# Patient Record
Sex: FEMALE | Race: WHITE | NOT HISPANIC OR LATINO | Employment: FULL TIME | URBAN - METROPOLITAN AREA
[De-identification: names, ages, dates, MRNs, and addresses within clinical notes are randomized per-mention and may not be internally consistent; named-entity substitution may affect disease eponyms.]

---

## 2023-05-23 LAB
COBALAMIN (VITAMIN B12) (PG/ML) IN SER/PLAS: 351 PG/ML (ref 211–911)
CREATININE (MG/DL) IN SER/PLAS: 0.77 MG/DL (ref 0.5–1.05)
FOLATE (NG/ML) IN SER/PLAS: 19.5 NG/ML
GFR FEMALE: >90 ML/MIN/1.73M2
SYPHILIS TOTAL AB: NONREACTIVE
UREA NITROGEN (MG/DL) IN SER/PLAS: 11 MG/DL (ref 6–23)

## 2023-05-25 LAB — ANGIOTENSIN CONVERTING ENZYME: 53 U/L (ref 16–85)

## 2023-05-26 LAB
ACETONE (VOLAT): <5 MG/DL
ARSENIC: <10 UG/L
COPPER: 132 UG/DL (ref 80–155)
ETHANOL (VOLAT): <5 MG/DL
ISOPROPANOL (VOLAT): <5 MG/DL
LEAD, BLOOD (ARUP): <2 UG/DL
MERCURY BLOOD: <2.5 UG/L
METHANOL (VOLAT): <5 MG/DL
NIL(NEG) CONTROL SPOT COUNT: NORMAL
PANEL A SPOT COUNT: 0
PANEL B SPOT COUNT: 0
POS CONTROL SPOT COUNT: NORMAL
T-SPOT. TB INTERPRETATION: NEGATIVE

## 2023-05-27 LAB — VITAMIN B1, WHOLE BLOOD: 133 NMOL/L (ref 70–180)

## 2023-06-01 LAB
CNS DEMYELINATING DISEASE INTERP, S: NORMAL
MOG FACS, S: NEGATIVE
NMO/AQP4 FACS, S: NEGATIVE

## 2023-12-01 ENCOUNTER — OFFICE VISIT (OUTPATIENT)
Dept: OPHTHALMOLOGY | Facility: CLINIC | Age: 38
End: 2023-12-01
Payer: COMMERCIAL

## 2023-12-01 DIAGNOSIS — H40.1232 LOW-TENSION GLAUCOMA, BILATERAL, MODERATE STAGE: Primary | ICD-10-CM

## 2023-12-01 PROCEDURE — 92083 EXTENDED VISUAL FIELD XM: CPT | Performed by: OPHTHALMOLOGY

## 2023-12-01 PROCEDURE — 99214 OFFICE O/P EST MOD 30 MIN: CPT | Performed by: OPHTHALMOLOGY

## 2023-12-01 PROCEDURE — 92133 CPTRZD OPH DX IMG PST SGM ON: CPT | Performed by: OPHTHALMOLOGY

## 2023-12-01 RX ORDER — NETARSUDIL AND LATANOPROST OPHTHALMIC SOLUTION, 0.02%/0.005% .2; .05 MG/ML; MG/ML
1 SOLUTION/ DROPS OPHTHALMIC; TOPICAL NIGHTLY
COMMUNITY

## 2023-12-01 ASSESSMENT — TONOMETRY
OD_IOP_MMHG: 9
OS_IOP_MMHG: 10
IOP_METHOD: GOLDMANN APPLANATION

## 2023-12-01 ASSESSMENT — CONF VISUAL FIELD
OS_INFERIOR_TEMPORAL_RESTRICTION: 0
OD_INFERIOR_NASAL_RESTRICTION: 0
OS_SUPERIOR_TEMPORAL_RESTRICTION: 0
OD_SUPERIOR_TEMPORAL_RESTRICTION: 0
OS_INFERIOR_NASAL_RESTRICTION: 0
OS_NORMAL: 1
OD_NORMAL: 1
OS_SUPERIOR_NASAL_RESTRICTION: 0
OD_SUPERIOR_NASAL_RESTRICTION: 0
OD_INFERIOR_TEMPORAL_RESTRICTION: 0

## 2023-12-01 ASSESSMENT — REFRACTION_WEARINGRX
OS_AXIS: 015
OD_CYLINDER: -1.50
OS_CYLINDER: -1.00
OD_AXIS: 160
OD_SPHERE: -7.00
OS_SPHERE: -7.00

## 2023-12-01 ASSESSMENT — SLIT LAMP EXAM - LIDS
COMMENTS: NORMAL
COMMENTS: NORMAL

## 2023-12-01 ASSESSMENT — CUP TO DISC RATIO
OS_RATIO: 0.9
OD_RATIO: 0.9

## 2023-12-01 ASSESSMENT — VISUAL ACUITY
METHOD: SNELLEN - LINEAR
CORRECTION_TYPE: GLASSES
OS_CC: 20/20-1
OD_CC: 20/20-2

## 2023-12-01 ASSESSMENT — EXTERNAL EXAM - RIGHT EYE: OD_EXAM: NORMAL

## 2023-12-01 ASSESSMENT — EXTERNAL EXAM - LEFT EYE: OS_EXAM: NORMAL

## 2023-12-01 ASSESSMENT — ENCOUNTER SYMPTOMS: EYES NEGATIVE: 1

## 2023-12-01 NOTE — PROGRESS NOTES
Visual Acuity (Snellen - Linear)         Right Left    Dist cc 20/20-2 20/20-1      Correction: Glasses          Tonometry       Tonometry (Goldmann Applanation, 8:48 AM)         Right Left    Pressure 9 10                  Assessment/Plan   Last dilation: 11/11/22  color plates OU: 10/10 OU 3/16/18 and on 12/1/23     1.  Low Tension Glaucoma OU:  /550 Tm 20/21.  No effect of timolol and caused burning (study - yes).   Simbrinza -> irritation.   Pt with h/o progression of HVF and RNFL from 2021 -> 2022 with IOPs in the low teens 10-14.  IOPs are now seemingly really well controlled, but pt with possible progression of HVFs from 2022 -> 2023.  Given relative stability of RNFL and really low IOPs, will not advance Rx      Plan:  cont rocklatan OU QHS               f/u 1 month, repeat HVF OU    2.  Myopia / Astigmatism OU:  pt ~7D myope, no myopic degeneration      Plan:  monitor      3.  Possible Early Cataracts OU:  minimal change in nuclear portion of lenses and likely cause of her starburst pattern.      Plan:  monitor

## 2023-12-07 ENCOUNTER — OFFICE VISIT (OUTPATIENT)
Dept: PRIMARY CARE | Facility: CLINIC | Age: 38
End: 2023-12-07
Payer: COMMERCIAL

## 2023-12-07 ENCOUNTER — ANCILLARY PROCEDURE (OUTPATIENT)
Dept: RADIOLOGY | Facility: CLINIC | Age: 38
End: 2023-12-07
Payer: COMMERCIAL

## 2023-12-07 VITALS
TEMPERATURE: 98.7 F | SYSTOLIC BLOOD PRESSURE: 140 MMHG | WEIGHT: 134 LBS | BODY MASS INDEX: 21.63 KG/M2 | DIASTOLIC BLOOD PRESSURE: 87 MMHG

## 2023-12-07 DIAGNOSIS — K59.00 CONSTIPATION, UNSPECIFIED CONSTIPATION TYPE: ICD-10-CM

## 2023-12-07 DIAGNOSIS — K59.00 CONSTIPATION, UNSPECIFIED CONSTIPATION TYPE: Primary | ICD-10-CM

## 2023-12-07 PROCEDURE — 99213 OFFICE O/P EST LOW 20 MIN: CPT | Performed by: FAMILY MEDICINE

## 2023-12-07 PROCEDURE — 74019 RADEX ABDOMEN 2 VIEWS: CPT

## 2023-12-07 PROCEDURE — 1036F TOBACCO NON-USER: CPT | Performed by: FAMILY MEDICINE

## 2023-12-07 NOTE — PROGRESS NOTES
"Subjective   Patient ID: Dina Martinez is a 38 y.o. female who presents for No chief complaint on file..  HPI  \"Think I'm starting menopause\"- having hot flashes and mood swings     Having issues having a bowel movement, not necessarily constipated as not always hard but has to put her fingers in her vagina to have a bowel movement x few months  Has intermittent abd pain/cramping    Thinks she eats a pretty healthy diet, has not taken fiber regularly   Will occasionally use stool softeners, denies use of laxatives  Denies any urinary issues including no urinary incontinence     Gyn: 2 children born vaginally, had partial hyst in 2020 due to DUB     Objective   Visit Vitals  /87   Temp 37.1 °C (98.7 °F) (Oral)      Physical Exam  Alert, well-appearing.    CVS: RRR, no murmurs.     Respiratory:  Clear and equal breath sounds.    GI: Soft, nontender, no masses or hepatosplenomegaly.     Assessment/Plan   Diagnoses and all orders for this visit:  Constipation, unspecified constipation type  -     XR abdomen 2 views supine and erect or decub; Future      Referral to uro-gyn as may be a pelvic floor issue and/or rectocele       Maribel Bower MD  Family Medicine   [facility]  "

## 2024-07-01 ENCOUNTER — APPOINTMENT (OUTPATIENT)
Dept: OPHTHALMOLOGY | Facility: CLINIC | Age: 39
End: 2024-07-01
Payer: COMMERCIAL

## 2024-07-01 DIAGNOSIS — H40.1232 LOW-TENSION GLAUCOMA, BILATERAL, MODERATE STAGE: Primary | ICD-10-CM

## 2024-07-01 PROCEDURE — 99213 OFFICE O/P EST LOW 20 MIN: CPT | Performed by: OPHTHALMOLOGY

## 2024-07-01 PROCEDURE — 92083 EXTENDED VISUAL FIELD XM: CPT | Performed by: OPHTHALMOLOGY

## 2024-07-01 RX ORDER — NETARSUDIL AND LATANOPROST OPHTHALMIC SOLUTION, 0.02%/0.005% .2; .05 MG/ML; MG/ML
1 SOLUTION/ DROPS OPHTHALMIC; TOPICAL NIGHTLY
Qty: 2.5 ML | Refills: 11 | Status: SHIPPED | OUTPATIENT
Start: 2024-07-01

## 2024-07-01 RX ORDER — BRIMONIDINE TARTRATE 1 MG/ML
1 SOLUTION/ DROPS OPHTHALMIC 2 TIMES DAILY
Qty: 10 ML | Refills: 11 | Status: SHIPPED | OUTPATIENT
Start: 2024-07-01 | End: 2025-07-01

## 2024-07-01 ASSESSMENT — VISUAL ACUITY
OS_CC: 20/25
METHOD: SNELLEN - LINEAR
CORRECTION_TYPE: GLASSES
OD_CC: 20/25-1

## 2024-07-01 ASSESSMENT — CONF VISUAL FIELD
OD_INFERIOR_TEMPORAL_RESTRICTION: 0
OD_INFERIOR_NASAL_RESTRICTION: 0
OS_INFERIOR_NASAL_RESTRICTION: 0
OS_SUPERIOR_NASAL_RESTRICTION: 0
OS_INFERIOR_TEMPORAL_RESTRICTION: 0
OD_NORMAL: 1
OS_SUPERIOR_TEMPORAL_RESTRICTION: 0
OS_NORMAL: 1
OD_SUPERIOR_NASAL_RESTRICTION: 0
OD_SUPERIOR_TEMPORAL_RESTRICTION: 0

## 2024-07-01 ASSESSMENT — EXTERNAL EXAM - LEFT EYE: OS_EXAM: NORMAL

## 2024-07-01 ASSESSMENT — CUP TO DISC RATIO
OD_RATIO: 0.9
OS_RATIO: 0.9

## 2024-07-01 ASSESSMENT — TONOMETRY
OS_IOP_MMHG: 11
IOP_METHOD: GOLDMANN APPLANATION
OD_IOP_MMHG: 12

## 2024-07-01 ASSESSMENT — SLIT LAMP EXAM - LIDS
COMMENTS: NORMAL
COMMENTS: NORMAL

## 2024-07-01 ASSESSMENT — ENCOUNTER SYMPTOMS: EYES NEGATIVE: 1

## 2024-07-01 ASSESSMENT — EXTERNAL EXAM - RIGHT EYE: OD_EXAM: NORMAL

## 2024-07-01 ASSESSMENT — PACHYMETRY
OD_CT(UM): 551
OS_CT(UM): 551

## 2024-07-01 NOTE — PROGRESS NOTES
Visual Acuity (Snellen - Linear)         Right Left    Dist cc 20/25-1 20/25      Correction: Glasses          Tonometry       Tonometry (Goldmann Applanation, 10:36 AM)         Right Left    Pressure 12 11                  Assessment/Plan   Last dilation: 12/1/23  color plates OU: 10/10 OU 3/16/18 and on 12/1/23    1.  Low Tension Glaucoma OU:  /550 Tm 20/21.  No effect of timolol and caused burning (study - yes).   Simbrinza -> irritation.   Pt with h/o progression of HVF and RNFL from 2021 -> 2022 with IOPs in the low teens 10-14.  IOPs were seemingly well controlled, but pt with confirmed progression OS from 2022 -> 2024; OD has returned to baseline.        Plan:  cont rocklatan OU QHS                Start Alphagan-P 0.1% OU BID                f/u 1 month    2.  Myopia / Astigmatism OU:  pt ~7D myope, no myopic degeneration      Plan:  monitor      3.  Possible Early Cataracts OU:  minimal change in nuclear portion of lenses and likely cause of her starburst pattern.      Plan:  monitor

## 2024-08-05 ENCOUNTER — APPOINTMENT (OUTPATIENT)
Dept: OPHTHALMOLOGY | Facility: CLINIC | Age: 39
End: 2024-08-05
Payer: COMMERCIAL

## 2024-12-16 ENCOUNTER — APPOINTMENT (OUTPATIENT)
Dept: OPHTHALMOLOGY | Facility: CLINIC | Age: 39
End: 2024-12-16
Payer: COMMERCIAL

## 2025-01-03 ENCOUNTER — APPOINTMENT (OUTPATIENT)
Dept: OPHTHALMOLOGY | Facility: CLINIC | Age: 40
End: 2025-01-03
Payer: COMMERCIAL

## 2025-01-06 ENCOUNTER — APPOINTMENT (OUTPATIENT)
Dept: OPHTHALMOLOGY | Facility: CLINIC | Age: 40
End: 2025-01-06
Payer: COMMERCIAL

## 2025-01-06 DIAGNOSIS — H40.1232 LOW-TENSION GLAUCOMA, BILATERAL, MODERATE STAGE: Primary | ICD-10-CM

## 2025-01-06 PROCEDURE — 99214 OFFICE O/P EST MOD 30 MIN: CPT | Performed by: OPHTHALMOLOGY

## 2025-01-06 ASSESSMENT — PACHYMETRY
OS_CT(UM): 551
OD_CT(UM): 551

## 2025-01-06 ASSESSMENT — VISUAL ACUITY
METHOD: SNELLEN - LINEAR
OS_CC: 20/20-1
CORRECTION_TYPE: GLASSES
OD_CC: 20/20-1

## 2025-01-06 ASSESSMENT — TONOMETRY
OD_IOP_MMHG: 13
OS_IOP_MMHG: 12-13
IOP_METHOD: GOLDMANN APPLANATION

## 2025-01-06 ASSESSMENT — ENCOUNTER SYMPTOMS
HEMATOLOGIC/LYMPHATIC NEGATIVE: 0
CARDIOVASCULAR NEGATIVE: 0
ALLERGIC/IMMUNOLOGIC NEGATIVE: 0
GASTROINTESTINAL NEGATIVE: 0
RESPIRATORY NEGATIVE: 0
MUSCULOSKELETAL NEGATIVE: 0
PSYCHIATRIC NEGATIVE: 0
CONSTITUTIONAL NEGATIVE: 0
EYES NEGATIVE: 1
ENDOCRINE NEGATIVE: 0
NEUROLOGICAL NEGATIVE: 0

## 2025-01-06 NOTE — PROGRESS NOTES
Visual Acuity (Snellen - Linear)         Right Left    Dist cc 20/20-1 20/20-1      Correction: Glasses          Tonometry       Tonometry (Goldmann Applanation, 9:31 AM)         Right Left    Pressure 13 12-13                  Assessment/Plan   Last dilation: 12/1/23  color plates OU: 10/10 OU 3/16/18 and on 12/1/23    1.  Low Tension Glaucoma OU:  /550 Tm 20/21.  No effect of timolol and caused burning (study - yes).   Simbrinza -> irritation.   Pt with h/o progression of HVF and RNFL from 2021 -> 2022 with IOPs in the low teens 10-14.  IOPs were seemingly well controlled, but pt with confirmed progression OS from 2022 -> 2024; OD has returned to baseline.  Alphagan-P (tolerated), but ineffective.        Pt on MTMT.  Discussed options 1) CPM, 2) SLT R/B/A reviewed.  Likelihood of working ~35%, but given that the next option is trab, would still try SLT      Plan:  cont rocklatan OU QHS                D/c  Alphagan-P 0.1%                 pt wishes to proceed with SLT OD (RIGHT)    2.  Myopia / Astigmatism OU:  pt ~7D myope, no myopic degeneration      Plan:  monitor      3.  Possible Early Cataracts OU:  minimal change in nuclear portion of lenses and likely cause of her starburst pattern.      Plan:  monitor

## 2025-02-17 ENCOUNTER — APPOINTMENT (OUTPATIENT)
Dept: OPHTHALMOLOGY | Facility: CLINIC | Age: 40
End: 2025-02-17
Payer: COMMERCIAL

## 2025-02-17 DIAGNOSIS — H40.1232 LOW-TENSION GLAUCOMA, BILATERAL, MODERATE STAGE: ICD-10-CM

## 2025-02-17 DIAGNOSIS — H40.1222 LOW-TENSION GLAUCOMA OF LEFT EYE, MODERATE STAGE: Primary | ICD-10-CM

## 2025-02-17 PROCEDURE — 65855 TRABECULOPLASTY LASER SURG: CPT | Mod: RIGHT SIDE | Performed by: OPHTHALMOLOGY

## 2025-02-17 RX ORDER — PREDNISOLONE ACETATE 10 MG/ML
1 SUSPENSION/ DROPS OPHTHALMIC 4 TIMES DAILY
Qty: 5 ML | Refills: 0 | Status: SHIPPED | OUTPATIENT
Start: 2025-02-17 | End: 2025-02-21

## 2025-02-17 RX ORDER — PREDNISOLONE ACETATE 10 MG/ML
1 SUSPENSION/ DROPS OPHTHALMIC 4 TIMES DAILY
Qty: 5 ML | Refills: 0 | Status: SHIPPED | OUTPATIENT
Start: 2025-02-17 | End: 2025-02-17 | Stop reason: SDUPTHER

## 2025-02-17 NOTE — PROGRESS NOTES
Last dilation: 12/1/23  color plates OU: 10/10 OU 3/16/18 and on 12/1/23    1.  Low Tension Glaucoma OU:  /550 Tm 20/21.  No effect of timolol and caused burning (study - yes).   Simbrinza -> irritation.   Pt with h/o progression of HVF and RNFL from 2021 -> 2022 with IOPs in the low teens 10-14.  IOPs were seemingly well controlled, but pt with confirmed progression OS from 2022 -> 2024; OD has returned to baseline.  Alphagan-P (tolerated), but ineffective.        Pt on MTMT.  Previously discussed options 1) CPM, 2) SLT R/B/A reviewed.  Likelihood of working ~35%, but given that the next option is trab, would still try SLT      Plan:  cont rocklatan OU QHS               proceed with SLT OD (RIGHT) today 2/17/25, pre-laser IOP = 13 mmHg OD    2.  Myopia / Astigmatism OU:  pt ~7D myope, no myopic degeneration      Plan:  monitor      3.  Possible Early Cataracts OU:  minimal change in nuclear portion of lenses and likely cause of her starburst pattern.      Plan:  monitor

## 2025-02-20 ENCOUNTER — CLINICAL SUPPORT (OUTPATIENT)
Dept: AUDIOLOGY | Facility: CLINIC | Age: 40
End: 2025-02-20
Payer: COMMERCIAL

## 2025-02-20 DIAGNOSIS — R94.128 ABNORMAL TYMPANOGRAM: ICD-10-CM

## 2025-02-20 DIAGNOSIS — H90.0 CONDUCTIVE HEARING LOSS, BILATERAL: Primary | ICD-10-CM

## 2025-02-20 PROCEDURE — 92557 COMPREHENSIVE HEARING TEST: CPT | Performed by: AUDIOLOGIST

## 2025-02-20 PROCEDURE — 92567 TYMPANOMETRY: CPT | Performed by: AUDIOLOGIST

## 2025-02-21 NOTE — PROGRESS NOTES
Name: Dina Martinez  YOB: 1985  Age: 40 y.o.    Date of Evaluation:  02/20/2025   Dina Martinez is seen today at the request of Maribel Bower MD for an evaluation of hearing.  Dina reports a history of bilateral hearing loss that has worsened over the past few months. She reports chronic congestion and aural fullness and notes difficulty hearing in her daily life.     Evaluation:  Otoscopy revealed clear canals with visible tympanic membranes bilaterally.    Immittance:  Right: Type C middle ear function with normal compliance, significantly negative peak pressure, and normal ear canal volume.  Left: Type C middle ear function with normal compliance, significantly negative peak pressure, and normal ear canal volume.      Behavioral Audiometry:  Right: mild conductive hearing loss 125-8000 Hz. Excellent word understanding (100 %) at 70 dB HL.  Left: mild conductive hearing loss 125-8000 Hz. Excellent word understanding (100 %) at 70 dB HL.    Pure tone averages in agreement with speech reception thresholds.    Results:  Today's results were discussed with the patient indicating mild conductive hearing loss 125-8000 Hz  bilaterally. Type C tympanograms and excellent word understanding bilaterally.    Treatment Plan:  Follow-up with ENT regarding conductive hearing loss  Retest hearing in conjunction with medical management or if a change in hearing occurs      Time: 8089-7332    Completed by:  Alessandro Abreu, CCC-A  Licensed Senior Audiologist

## 2025-03-03 ENCOUNTER — APPOINTMENT (OUTPATIENT)
Dept: OPHTHALMOLOGY | Facility: CLINIC | Age: 40
End: 2025-03-03
Payer: COMMERCIAL

## 2025-03-03 DIAGNOSIS — H40.1232 LOW-TENSION GLAUCOMA, BILATERAL, MODERATE STAGE: Primary | ICD-10-CM

## 2025-03-03 PROCEDURE — 99213 OFFICE O/P EST LOW 20 MIN: CPT | Performed by: OPHTHALMOLOGY

## 2025-03-03 PROCEDURE — 92020 GONIOSCOPY: CPT | Performed by: OPHTHALMOLOGY

## 2025-03-03 RX ORDER — FLUTICASONE PROPIONATE 50 MCG
1 SPRAY, SUSPENSION (ML) NASAL DAILY
COMMUNITY

## 2025-03-03 ASSESSMENT — TONOMETRY
OD_IOP_MMHG: 10
OS_IOP_MMHG: 12
IOP_METHOD: GOLDMANN APPLANATION

## 2025-03-03 ASSESSMENT — ENCOUNTER SYMPTOMS
GASTROINTESTINAL NEGATIVE: 0
PSYCHIATRIC NEGATIVE: 0
HEMATOLOGIC/LYMPHATIC NEGATIVE: 0
MUSCULOSKELETAL NEGATIVE: 0
ALLERGIC/IMMUNOLOGIC NEGATIVE: 0
ENDOCRINE NEGATIVE: 0
CONSTITUTIONAL NEGATIVE: 0
CARDIOVASCULAR NEGATIVE: 0
RESPIRATORY NEGATIVE: 0
EYES NEGATIVE: 1
NEUROLOGICAL NEGATIVE: 0

## 2025-03-03 ASSESSMENT — GONIOSCOPY
OD_SUPERIOR: D30R TR PTM
OD_INFERIOR: D30R TR PTM
OS_TEMPORAL: D30R TR PTM
OS_NASAL: D30R TR PTM
OS_SUPERIOR: D30R TR PTM
OD_TEMPORAL: D30R TR PTM
OD_NASAL: D30R TR PTM
OS_INFERIOR: D30R TR PTM

## 2025-03-03 ASSESSMENT — EXTERNAL EXAM - LEFT EYE: OS_EXAM: NORMAL

## 2025-03-03 ASSESSMENT — CONF VISUAL FIELD
OD_SUPERIOR_NASAL_RESTRICTION: 0
OS_NORMAL: 1
OS_INFERIOR_NASAL_RESTRICTION: 0
OD_NORMAL: 1
OS_SUPERIOR_TEMPORAL_RESTRICTION: 0
OS_INFERIOR_TEMPORAL_RESTRICTION: 0
OD_INFERIOR_TEMPORAL_RESTRICTION: 0
OD_INFERIOR_NASAL_RESTRICTION: 0
OS_SUPERIOR_NASAL_RESTRICTION: 0
METHOD: COUNTING FINGERS
OD_SUPERIOR_TEMPORAL_RESTRICTION: 0

## 2025-03-03 ASSESSMENT — REFRACTION_WEARINGRX
OD_CYLINDER: -1.50
OD_AXIS: 160
OS_SPHERE: -7.00
OD_SPHERE: -7.00
OS_AXIS: 015
OS_CYLINDER: -1.00

## 2025-03-03 ASSESSMENT — EXTERNAL EXAM - RIGHT EYE: OD_EXAM: NORMAL

## 2025-03-03 ASSESSMENT — PACHYMETRY
OD_CT(UM): 551
OS_CT(UM): 551

## 2025-03-03 ASSESSMENT — SLIT LAMP EXAM - LIDS
COMMENTS: NORMAL
COMMENTS: NORMAL

## 2025-03-03 ASSESSMENT — CUP TO DISC RATIO
OS_RATIO: 0.9
OD_RATIO: 0.9

## 2025-03-03 ASSESSMENT — VISUAL ACUITY
OD_CC: 20/20
CORRECTION_TYPE: GLASSES
OS_CC: 20/20-1
METHOD: SNELLEN - LINEAR

## 2025-03-03 NOTE — PROGRESS NOTES
Visual Acuity (Snellen - Linear)         Right Left    Dist cc 20/20 20/20-1      Correction: Glasses          Tonometry       Tonometry (Goldmann Applanation, 8:47 AM)         Right Left    Pressure 10 12                  Assessment/Plan   Last dilation: 7/1/24  color plates OU: 10/10 OU 3/16/18 and on 12/1/23  Last gonio 3/3/25 OD:  D30r tr PTM    OS:  D30r tr PTM    1.  Low Tension Glaucoma OU:  /550 Tm 20/21.  No effect of timolol and caused burning (study - yes).   Simbrinza -> irritation.   Pt with h/o progression of HVF and RNFL from 2021 -> 2022 with IOPs in the low teens 10-14.  IOPs were seemingly well controlled, but pt with confirmed progression OS from 2022 -> 2024; OD has returned to baseline.  Alphagan-P (tolerated), but ineffective.        Pt on MTMT.  S/p SLT OD (RIGHT) 2/17/25, pre-laser IOP = 13 mmHg OD = 3 mmHg reduction and now IOP is acceptable      Plan:  cont rocklatan OU QHS                F/u 4 months (HVF, dilation, RNFL)    2.  Myopia / Astigmatism OU:  pt ~7D myope, no myopic degeneration      Plan:  monitor      3.  Possible Early Cataracts OU:  minimal change in nuclear portion of lenses and likely cause of her starburst pattern.      Plan:  monitor

## 2025-03-27 ENCOUNTER — APPOINTMENT (OUTPATIENT)
Facility: CLINIC | Age: 40
End: 2025-03-27
Payer: COMMERCIAL

## 2025-03-27 VITALS
WEIGHT: 134 LBS | BODY MASS INDEX: 21.53 KG/M2 | DIASTOLIC BLOOD PRESSURE: 91 MMHG | HEIGHT: 66 IN | SYSTOLIC BLOOD PRESSURE: 156 MMHG

## 2025-03-27 DIAGNOSIS — H90.0 CONDUCTIVE HEARING LOSS, BILATERAL: ICD-10-CM

## 2025-03-27 DIAGNOSIS — H69.93 DYSFUNCTION OF BOTH EUSTACHIAN TUBES: Primary | ICD-10-CM

## 2025-03-27 PROCEDURE — 3008F BODY MASS INDEX DOCD: CPT

## 2025-03-27 PROCEDURE — 1036F TOBACCO NON-USER: CPT

## 2025-03-27 PROCEDURE — 92504 EAR MICROSCOPY EXAMINATION: CPT

## 2025-03-27 PROCEDURE — 99203 OFFICE O/P NEW LOW 30 MIN: CPT

## 2025-03-27 NOTE — PROGRESS NOTES
Patient ID: Dina Martinez is a 40 y.o. female who presents for the evaluation of hearing loss.    PROVIDER IMPRESSIONS:  DIAGNOSES/PROBLEMS:  - Bilateral Eustachian tube dysfunction  - Bilateral conductive hearing loss    ASSESSMENT:   Dina Martinez is a pleasant 40 y.o. female who presents with symptoms of bilateral hearing difficulty, bilateral aural fullness, and intermittent bilateral non-pulsatile tinnitus.  Based on the clinical information provided, symptoms and clinical exam findings are consistent with bilateral Eustachian tube dysfunction. Otologic exam today revealed bilateral TM retraction. Reassurance provided to patient that exam today showed no evidence of acute infection or inflammation in the EAC bilaterally and that TM appears with no evidence of infection, effusion, or perforation bilaterally.  Audiogram reviewed in detail with the patient, which revealed mild bilateral conductive hearing loss across all frequencies and type C tympanogram bilaterally. The patient was counseled on the possible etiologies of obstructive ETD. Typical symptoms were reviewed, including otalgia, aural fullness, muffled or decreased hearing, tinnitus, plugged ear sensation, and vertigo or disequilibrium in severe cases.  The patient was educated that shiela-challenge, such as air travel or land elevation, may provoke symptom presentation or exacerbation.    PLAN:  I recommended starting nasal steroid spray  fluticasone (Flonase). I recommended either 2 puffs into each nostril daily, or 1 puff into each nostril twice daily for a consistent trial of at least 4-6 weeks. Patient counseled that this medication is a topical intranasal steroid nasal spray indicated to reduce nasal inflammation. Patient was educated on proper administration of nasal spray, by tilting their head down and pointing the applicator tip towards the lateral wall of the nose/corner of the eye on the same side. I advised patient to avoid pointing  applicator toward the septum due to the risk of nasal bleeding.  Patient informed to discontinue the spray if they experience adverse side effects. This medication may be purchased over the counter; a prescription may be sent to the patient's pharmacy upon request.  I recommended that the patient initiates the following 12-day cycled Afrin regimen:  Begin use of Afrin. Administer 2 sprays in each nostril twice per day. Do this for 3 days in a row. Take one day off. Do it for another 3 days in a row.  Take one day off. Do it for another 3 days in a row, and then stop the medication cycle.  If you do not take a break using Afrin after 3 days of use, rebound nasal congestion may occur. This medication may be purchased over the counter; a prescription may be sent to the patient's pharmacy upon request.  Patient instructed to practice autoinsufflation (pinch your nose and pop your ears) several times per day to exercise the Eustachian Tube.   Follow-up: Patient may schedule for follow-up in 3-4 weeks with repeat audiogram to evaluate treatment outcomes. They may follow-up sooner, if needed.  If there is no improvement in symptoms or repeat audiogram results at follow-up, I may consider a referral to my otology physician colleagues to determine further management or for possible procedural intervention (i.e. myringotomy, PE tube insertion, Eustachian tube dilation). Patient is agreeable to this plan, all questions were answered to patient's satisfaction.     Subjective   HPI: Dina Martinez is a 40 y.o. female who presents for the initial evaluation of hearing difficulty in both ears.  The patient states that symptom onset began approximately 1 yr ago. She reports that hearing in both ears seems muffled, right worse than left. Reports that she has also noticed a fullness sensation in both ears. States that she has experienced non-pulsatile tinnitus in both ears for the past several years. When asked about the presence of  ear pain, ear itching, ear drainage, autophony, dizziness or vertigo, she admits to none. When asked about pertinent otologic history, the patient reports a history of recurrent ear infections and sinus infections throughout her lifetime. States that she intermittently uses Flonase spray and o.t.c. oral antihistamine. She denies any recent ear/sinus infection treatment with antibiotic therapy. She denies history of ear surgery, denies history of PE tube insertion, and denies history of prolonged/traumatic loud noise exposure. The patient does not endorse a family history of hearing loss.       PATIENT HISTORY:  Past Medical History:   Diagnosis Date    Encounter for gynecological examination (general) (routine) without abnormal findings 08/27/2019    Encounter for annual routine gynecological examination    Excessive and frequent menstruation with regular cycle 06/19/2020    Menorrhagia with regular cycle    Low-tension glaucoma, bilateral, moderate stage 02/08/2017    Low-tension glaucoma of both eyes, moderate stage    Low-tension glaucoma, bilateral, severe stage 04/03/2017    Low-tension glaucoma of both eyes, severe stage    Myopia, bilateral 11/30/2018    High myopia, both eyes    Pelvic and perineal pain 08/26/2020    Pelvic pain    Personal history of other diseases of the respiratory system 11/06/2017    History of acute sinusitis    Personal history of other diseases of the respiratory system 01/02/2017    History of acute sinusitis    Primary open-angle glaucoma, bilateral, mild stage 11/22/2016    Primary open angle glaucoma of both eyes, mild stage    Primary open-angle glaucoma, unspecified eye, stage unspecified 11/22/2016    POAG (primary open-angle glaucoma)    Tinnitus       Past Surgical History:   Procedure Laterality Date    OTHER SURGICAL HISTORY  03/06/2017    History Of Prior Surgery      Allergies   Allergen Reactions    Amoxicillin Unknown     Fever, and rash    Nickel Dermatitis     Adhesive Tape-Silicones Unknown    Dorzolamide Hives    Latex Rash        Current Outpatient Medications:     Alphagan P 0.1 % ophthalmic solution, Administer 1 drop into both eyes 2 times a day., Disp: 10 mL, Rfl: 11    cetirizine (ZYRTEC) 10 mg capsule, Take 1 tablet by mouth once daily., Disp: , Rfl:     fluticasone (Flonase) 50 mcg/actuation nasal spray, Administer 1 spray into each nostril once daily. Shake gently. Before first use, prime pump. After use, clean tip and replace cap., Disp: , Rfl:     Rocklatan 0.02-0.005 % drops, Administer 1 drop into both eyes once daily at bedtime., Disp: 2.5 mL, Rfl: 11   Tobacco Use: Low Risk  (3/3/2025)    Patient History     Smoking Tobacco Use: Never     Smokeless Tobacco Use: Never     Passive Exposure: Not on file      Alcohol Use: Not on file      Social History     Substance and Sexual Activity   Drug Use Never        Review of Systems   All other systems negative.     Objective   Visit Vitals  Smoking Status Never        PHYSICAL EXAM:  General appearance: Appears well, well-nourished, well groomed. No acute distress.   Constitutional: No fever, chills, weight loss or weight gain.  Communication: Normal communication  Psychiatric: Oriented to person, place and time. Normal mood and affect.  Neurologic: Cranial nerves II-XII grossly intact and symmetric bilaterally.  Cardiovascular: Examination of peripheral vascular system shows no clubbing or cyanosis.  Respiratory: No respiratory distress increased work of breathing. Inspection of the chest with symmetric chest expansion and normal respiratory effort.  Skin: No head and neck rashes.  Head: Normocephalic. Atraumatic with no masses, lesions or scarring.  Face: Normal symmetry. No scars or deformities.  Eyes: Conjunctiva not edematous or erythematous. PERRLA  Neck: Supple and symmetric, trachea midline. Lymph nodes with no adenopathy.  Head: Normocephalic. Atraumatic with no masses, lesions or scarring.  Eyes: PERRL,  EOMI, Conjunctiva is clear. No nystagmus.  Nose: External inspection of nose: No nasal lesions, lacerations or scars. No tenderness on frontal or maxillary sinus palpation. Anterior rhinoscopy with limited visualization past the inferior turbinates: Septum is deviated to the left.  No septal perforation or lesions. No septal hematoma/ seroma.  No signs of bleeding.  No evidence of intranasal polyps.  Inferior turbinates are hypertrophied.    Throat:  Floor of mouth is clear, no masses.  Tongue appears normal, no lesions or masses. Gums, gingiva, buccal mucosa appear pink and moist, no lesions. Teeth are in intact.  No obvious dental infections.  Peritonsillar regions appear symmetric without swelling.  Hard and soft palate appear normal, no obvious cleft. Uvula is midline.  Left Tonsil -- 1.5+, no exudates.  Right Tonsil -- 1.5+, no exudates.  Oropharynx: No lesions. Retropharyngeal wall is flat.  No postnasal drip.  Salivary Glands: Symmetric bilaterally.  No palpable masses.  No evidence of acute infection or salivary stones.  TMJ: Normal, no trismus.  Right Ear: External inspection of ear with no deformity, scars, or masses. Mastoid is nontender. External auditory canal is clear.  TM is intact with mild retraction and no sign of infection, effusion, or perforation seen. Auto insufflation is visible under microscopy with hypomobile TM.   Left Ear: External inspection of ear with no deformity, scars, or masses. Mastoid is nontender. External auditory canal is clear.  TM is intact with mild retraction and no sign of infection, effusion, or perforation seen. Auto insufflation is not visible under microscopy.    RESULTS:  I personally reviewed the patient's audiogram from 2/20/25, which revealed the following results: Right ear with mild conductive hearing loss across all frequencies. Left ear with mild conductive hearing loss across all frequencies. Right ear with Type C tympanogram, and left ear with Type C  tympanogram. Right ear with 100% WRS at 70 dB, and left ear with 100% WRS at 70 dB. Acoustic reflexes MUNA right ipsilateral, and MUNA left ipsilateral.      Mary Jane Alonso, APRN-CNP

## 2025-04-29 NOTE — PROGRESS NOTES
AUDIOLOGIC EVALUATION  Name: Dina Martinez  YOB: 1985  MRN: [id number]  Age: 40 y.o.    Date of Evaluation:  4/30/2025     History:  Dina Martinez, 40 y.o., was seen today for a hearing evaluation in a conjunction visit with ISABELL Duron. The patient reported that her symptoms have improved but are not gone entirely. She noted persistent pressure in both ears. She reported that she is able to pop them. This can cause short-duration dizziness. She has intermittent tinnitus in both ears. Her hearing has slightly improved since her last visit. She denied otalgia and otorrhea.     Recall: Dina reports a history of bilateral hearing loss that has worsened over the past few months. She reports chronic congestion and aural fullness and notes difficulty hearing in her daily life.     Previous audiologic evaluation on 2/20/2025 revealed a mild conductive hearing loss 125-8000 Hz  bilaterally. Type C tympanograms and excellent word understanding bilaterally.    Evaluation:    Otoscopy  Clear canals bilaterally    Tympanometry  Right ear: Type A, normal ear canal volume and compliance.  Left ear: Type A, normal ear canal volume and compliance.     Acoustic Reflexes  Right ear: Could not maintain seal  Left ear: Could not maintain seal    Audiometric Evaluation  Right ear: mild, likely conductive, hearing loss through 250 Hz rising to normal hearing. Word recognition ability estimated to be excellent(96%) at 50 dB HL based on an NU-6 recorded 25-word list.  Left ear: normal hearing sensitivity. Word recognition ability estimated to be excellent(100%) at 50 dB HL based on an NU-6 recorded ordered by difficulty 10-word list.    When compared to previous test results of 2/20/2025, thresholds are stable with the exception of improved hearing sensitivity from 500 - 2000 Hz in the right ear only.    The test results were discussed with the patient and they were returned to ISABELL Duron  for completion of the office visit.    Impressions  Today's evaluation revealed normal hearing in the left ear and a mild hearing loss through 250 Hz rising to normal hearing in the right ear. Word recognition abilities were measured to be excellent bilaterally. Tympanograms were type A (normal) in both ears.    All patient questions were answered.     Recommendations  - Continue medical follow-up with established providers   - Continue medical follow-up with ISABELL Duron  - Re-test hearing in conjunction with otologic management    Time: 4105-9530    ENRIKE Campbell, CCC-A  Licensed Audiologist

## 2025-04-30 ENCOUNTER — APPOINTMENT (OUTPATIENT)
Facility: CLINIC | Age: 40
End: 2025-04-30
Payer: COMMERCIAL

## 2025-04-30 ENCOUNTER — CLINICAL SUPPORT (OUTPATIENT)
Dept: AUDIOLOGY | Facility: CLINIC | Age: 40
End: 2025-04-30
Payer: COMMERCIAL

## 2025-04-30 VITALS
HEIGHT: 66 IN | BODY MASS INDEX: 21.53 KG/M2 | DIASTOLIC BLOOD PRESSURE: 79 MMHG | SYSTOLIC BLOOD PRESSURE: 126 MMHG | WEIGHT: 134 LBS

## 2025-04-30 DIAGNOSIS — H90.11 CONDUCTIVE HEARING LOSS OF RIGHT EAR WITH UNRESTRICTED HEARING OF LEFT EAR: Primary | ICD-10-CM

## 2025-04-30 DIAGNOSIS — R94.128 ABNORMAL TYMPANOGRAM: ICD-10-CM

## 2025-04-30 DIAGNOSIS — Z86.69 HISTORY OF EUSTACHIAN TUBE DYSFUNCTION: ICD-10-CM

## 2025-04-30 DIAGNOSIS — H93.13 TINNITUS OF BOTH EARS: ICD-10-CM

## 2025-04-30 DIAGNOSIS — H91.91 HEARING LOSS OF RIGHT EAR, UNSPECIFIED HEARING LOSS TYPE: Primary | ICD-10-CM

## 2025-04-30 PROCEDURE — 3008F BODY MASS INDEX DOCD: CPT

## 2025-04-30 PROCEDURE — 92557 COMPREHENSIVE HEARING TEST: CPT

## 2025-04-30 PROCEDURE — 1036F TOBACCO NON-USER: CPT

## 2025-04-30 PROCEDURE — 92567 TYMPANOMETRY: CPT

## 2025-04-30 PROCEDURE — 92504 EAR MICROSCOPY EXAMINATION: CPT

## 2025-04-30 PROCEDURE — 99213 OFFICE O/P EST LOW 20 MIN: CPT

## 2025-04-30 ASSESSMENT — PAIN - FUNCTIONAL ASSESSMENT: PAIN_FUNCTIONAL_ASSESSMENT: 0-10

## 2025-04-30 ASSESSMENT — PAIN SCALES - GENERAL: PAINLEVEL_OUTOF10: 0 - NO PAIN

## 2025-04-30 NOTE — LETTER
April 30, 2025     Maribel Bower MD  [address]  [facility], [address]  [facility] [address]    Patient: Dina Martinez   YOB: 1985   Date of Visit: 4/30/2025       Dear Dr. Maribel Bower MD:    Thank you for referring Dina Martinez to me for evaluation. Below are my notes for this consultation.  If you have questions, please do not hesitate to call me. I look forward to following your patient along with you.       Sincerely,     ENRIKE Campbell, CCC-A      CC: No Recipients  ______________________________________________________________________________________    AUDIOLOGIC EVALUATION  Name: Dina Martinez  YOB: 1985  MRN: [id number]  Age: 40 y.o.    Date of Evaluation:  4/30/2025     History:  Dina Martinez, 40 y.o., was seen today for a hearing evaluation in a conjunction visit with ISABELL Duron. The patient reported that her symptoms have improved but are not gone entirely. She noted persistent pressure in both ears. She reported that she is able to pop them. This can cause short-duration dizziness. She has intermittent tinnitus in both ears. Her hearing has slightly improved since her last visit. She denied otalgia and otorrhea.     Recall: Dina reports a history of bilateral hearing loss that has worsened over the past few months. She reports chronic congestion and aural fullness and notes difficulty hearing in her daily life.     Previous audiologic evaluation on 2/20/2025 revealed a mild conductive hearing loss 125-8000 Hz  bilaterally. Type C tympanograms and excellent word understanding bilaterally.    Evaluation:    Otoscopy  Clear canals bilaterally    Tympanometry  Right ear: Type A, normal ear canal volume and compliance.  Left ear: Type A, normal ear canal volume and compliance.     Acoustic Reflexes  Right ear: Could not maintain seal  Left ear: Could not maintain seal    Audiometric Evaluation  Right ear: mild, likely conductive,  hearing loss through 250 Hz rising to normal hearing. Word recognition ability estimated to be excellent(96%) at 50 dB HL based on an NU-6 recorded 25-word list.  Left ear: normal hearing sensitivity. Word recognition ability estimated to be excellent(100%) at 50 dB HL based on an NU-6 recorded ordered by difficulty 10-word list.    When compared to previous test results of 2/20/2025, thresholds are stable with the exception of improved hearing sensitivity from 500 - 2000 Hz in the right ear only.    The test results were discussed with the patient and they were returned to ISABELL Duron for completion of the office visit.    Impressions  Today's evaluation revealed normal hearing in the left ear and a mild hearing loss through 250 Hz rising to normal hearing in the right ear. Word recognition abilities were measured to be excellent bilaterally. Tympanograms were type A (normal) in both ears.    All patient questions were answered.     Recommendations  - Continue medical follow-up with established providers   - Continue medical follow-up with ISABELL Duron  - Re-test hearing in conjunction with otologic management    Time: 0930-1000    ENRIKE Campbell, CCC-A  Licensed Audiologist

## 2025-04-30 NOTE — PROGRESS NOTES
Patient ID: Dina Martinez is a 40 y.o. female who presents for subsequent evaluation of ears.     PROVIDER IMPRESSIONS:  DIAGNOSES/PROBLEMS:  -Hearing loss of right ear  -Bilateral tinnitus  -History of Eustachian tube dysfunction    ASSESSMENT:   Dina Martinez is a pleasant 40 y.o. female who presents for subsequent evaluation of bilateral hearing difficulty, bilateral aural fullness, and intermittent bilateral non-pulsatile tinnitus. The patient endorses that symptoms are improving  since previous visit with adherence to prior management recommendations. Based on the clinical information provided, symptoms and clinical exam findings are consistent with resolution of bilateral ETD. Reassurance provided to patient that bilateral EAC appears with no sign of acute infection or inflammation and that bilateral TM appears with no sign of infection, effusion, retraction or perforation. Recent audiogram results reviewed in detail with the patient, which revealed mild right low-frequency sensorineural hearing loss, and unrestricted hearing in the left ear with normal tympanogram bilaterally. When compared to prior audiogram from 2/20/25, results show resolution of conductive loss components bilaterally. Patient offered reassurance and counseling on the current clinical findings and management recommendations. I recommended monitoring low-frequency right hearing loss at this time.     PLAN:  I recommended patient continues to use Flonase with 2 puffs in each nostril once daily to maintain ET orifice patency. Prescription refill declined.   Patient advised to wear ear hearing protection while in the presence of loud sounds. The patient was also counseled to utilize use tinnitus coping strategies as needed, such as sound apps on a smartphone, utilizing calming noise in the room, running a fan at night, etc.    Follow-up: Patient may schedule for follow-up 1 yr with repeat audiogram for routine annual hearing surveillance,  sooner as needed. Patient is agreeable to plan. All questions answered to patient's satisfaction.     Subjective   HPI: Dina Martinez is a 40 y.o. female who presents for a follow-up evaluation for bilateral hearing difficulty, bilateral aural fullness, and intermittent bilateral non-pulsatile tinnitus. Since last visit on 3/27/25, the patient states that symptoms of bilateral hearing difficulty and bilateral ear fullness have significantly improved. Reports that she is now able to hear her son if he is speaking to her in another room. She does continue to notice bilateral non-pulsatile tinnitus which she feels has remained unchanged. Patient has been consistent with recommended Flonase spray daily for ETD. She was unable to tolerate cycled Afrin nasal spray regimen as she noticed a sore throat with use. Denies any new symptoms of ear pain, ear itching, ear drainage, autophony, dizziness and/or vertigo.       RECALL 3/27/25:  HPI: Dina Martinez is a 40 y.o. female who presents for the initial evaluation of hearing difficulty in both ears. The patient states that symptom onset began approximately 1 yr ago. She reports that hearing in both ears seems muffled, right worse than left. Reports that she has also noticed a fullness sensation in both ears. States that she has experienced non-pulsatile tinnitus in both ears for the past several years. When asked about the presence of ear pain, ear itching, ear drainage, autophony, dizziness or vertigo, she admits to none. When asked about pertinent otologic history, the patient reports a history of recurrent ear infections and sinus infections throughout her lifetime. States that she intermittently uses Flonase spray and o.t.c. oral antihistamine. She denies any recent ear/sinus infection treatment with antibiotic therapy. She denies history of ear surgery, denies history of PE tube insertion, and denies history of prolonged/traumatic loud noise exposure. The patient does not  endorse a family history of hearing loss.   ASSESSMENT:   Dina Martinez is a pleasant 40 y.o. female who presents with symptoms of bilateral hearing difficulty, bilateral aural fullness, and intermittent bilateral non-pulsatile tinnitus. Based on the clinical information provided, symptoms and clinical exam findings are consistent with bilateral Eustachian tube dysfunction. Otologic exam today revealed bilateral TM retraction. Reassurance provided to patient that exam today showed no evidence of acute infection or inflammation in the EAC bilaterally and that TM appears with no evidence of infection, effusion, or perforation bilaterally.  Audiogram reviewed in detail with the patient, which revealed mild bilateral conductive hearing loss across all frequencies and type C tympanogram bilaterally. The patient was counseled on the possible etiologies of obstructive ETD. Typical symptoms were reviewed, including otalgia, aural fullness, muffled or decreased hearing, tinnitus, plugged ear sensation, and vertigo or disequilibrium in severe cases.  The patient was educated that shiela-challenge, such as air travel or land elevation, may provoke symptom presentation or exacerbation.  PLAN:  I recommended starting nasal steroid spray fluticasone (Flonase). I recommended either 2 puffs into each nostril daily, or 1 puff into each nostril twice daily for a consistent trial of at least 4-6 weeks. Patient counseled that this medication is a topical intranasal steroid nasal spray indicated to reduce nasal inflammation. Patient was educated on proper administration of nasal spray, by tilting their head down and pointing the applicator tip towards the lateral wall of the nose/corner of the eye on the same side. I advised patient to avoid pointing applicator toward the septum due to the risk of nasal bleeding.  Patient informed to discontinue the spray if they experience adverse side effects. This medication may be purchased over the  counter; a prescription may be sent to the patient's pharmacy upon request.  I recommended that the patient initiates the following 12-day cycled Afrin regimen: Begin use of Afrin. Administer 2 sprays in each nostril twice per day. Do this for 3 days in a row. Take one day off. Do it for another 3 days in a row.  Take one day off. Do it for another 3 days in a row, and then stop the medication cycle.  If you do not take a break using Afrin after 3 days of use, rebound nasal congestion may occur. This medication may be purchased over the counter; a prescription may be sent to the patient's pharmacy upon request.  Patient instructed to practice autoinsufflation (pinch your nose and pop your ears) several times per day to exercise the Eustachian Tube.   Follow-up: Patient may schedule for follow-up in 3-4 weeks with repeat audiogram to evaluate treatment outcomes. They may follow-up sooner, if needed.  If there is no improvement in symptoms or repeat audiogram results at follow-up, I may consider a referral to my otology physician colleagues to determine further management or for possible procedural intervention (i.e. myringotomy, PE tube insertion, Eustachian tube dilation). Patient is agreeable to this plan, all questions were answered to patient's satisfaction.      PATIENT HISTORY:  Medical History[1]   Surgical History[2]   RX Allergies[3]   Current Medications[4]   Tobacco Use: Low Risk  (3/27/2025)    Patient History     Smoking Tobacco Use: Never     Smokeless Tobacco Use: Never     Passive Exposure: Not on file      Alcohol Use: Not on file      Social History     Substance and Sexual Activity   Drug Use Never        Review of Systems   All other systems negative.     Objective   ENT FOCUSED PHYSICAL EXAM:   Right Ear: External inspection of ear with no deformity, scars, or masses. Mastoid is nontender. External auditory canal is clear. TM is intact with no sign of infection, effusion, or retraction.  No  perforation seen. Auto insufflation visible under microscopy.  Left Ear: External inspection of ear with no deformity, scars, or masses. Mastoid is nontender. External auditory canal is clear. TM is intact with no sign of infection, effusion, or retraction.  No perforation seen. Auto insufflation visible under microscopy.    RESULTS:  I personally reviewed the patient's audiogram from 04/30/25, which revealed the following results: Right ear with mild hearing loss through 250 Hz, rising to normal hearing above . Left ear with normal hearing across all frequencies. Right ear with normal tympanogram, and left ear with normal tympanogram. Right ear with 96% WRS at 50 dB, and left ear with 100% WRS at 50 dB. Acoustic reflexes MUNA right ipsilateral, and MUNA left ipsilateral.  When compared to prior audiogram from 2/20/25, results show hearing improvement in the bilateral ears and improvement in tympanograms bilaterally.      Mary Jane Alonso, APRN-CNP          [1]   Past Medical History:  Diagnosis Date    Encounter for gynecological examination (general) (routine) without abnormal findings 08/27/2019    Encounter for annual routine gynecological examination    Excessive and frequent menstruation with regular cycle 06/19/2020    Menorrhagia with regular cycle    Low-tension glaucoma, bilateral, moderate stage 02/08/2017    Low-tension glaucoma of both eyes, moderate stage    Low-tension glaucoma, bilateral, severe stage 04/03/2017    Low-tension glaucoma of both eyes, severe stage    Myopia, bilateral 11/30/2018    High myopia, both eyes    Pelvic and perineal pain 08/26/2020    Pelvic pain    Personal history of other diseases of the respiratory system 11/06/2017    History of acute sinusitis    Personal history of other diseases of the respiratory system 01/02/2017    History of acute sinusitis    Primary open-angle glaucoma, bilateral, mild stage 11/22/2016    Primary open angle glaucoma of both eyes, mild stage     Primary open-angle glaucoma, unspecified eye, stage unspecified 11/22/2016    POAG (primary open-angle glaucoma)    Tinnitus    [2]   Past Surgical History:  Procedure Laterality Date    OTHER SURGICAL HISTORY  03/06/2017    History Of Prior Surgery   [3]   Allergies  Allergen Reactions    Amoxicillin Unknown     Fever, and rash    Nickel Dermatitis    Adhesive Tape-Silicones Unknown    Dorzolamide Hives    Latex Rash   [4]   Current Outpatient Medications:     cetirizine (ZYRTEC) 10 mg capsule, Take 1 tablet by mouth once daily., Disp: , Rfl:     fluticasone (Flonase) 50 mcg/actuation nasal spray, Administer 1 spray into each nostril once daily. Shake gently. Before first use, prime pump. After use, clean tip and replace cap., Disp: , Rfl:     Rocklatan 0.02-0.005 % drops, Administer 1 drop into both eyes once daily at bedtime., Disp: 2.5 mL, Rfl: 11

## 2025-07-14 DIAGNOSIS — Z00.00 ROUTINE GENERAL MEDICAL EXAMINATION AT A HEALTH CARE FACILITY: ICD-10-CM

## 2025-07-14 DIAGNOSIS — K58.1 IRRITABLE BOWEL SYNDROME WITH CONSTIPATION: ICD-10-CM

## 2025-07-14 DIAGNOSIS — K21.9 GASTROESOPHAGEAL REFLUX DISEASE, UNSPECIFIED WHETHER ESOPHAGITIS PRESENT: ICD-10-CM

## 2025-07-14 DIAGNOSIS — Z13.220 SCREENING FOR LIPID DISORDERS: ICD-10-CM

## 2025-07-14 DIAGNOSIS — Z91.018 FOOD ALLERGY: ICD-10-CM

## 2025-07-14 DIAGNOSIS — Z13.0 SCREENING FOR BLOOD DISEASE: ICD-10-CM

## 2025-07-14 DIAGNOSIS — H40.1190 PRIMARY OPEN ANGLE GLAUCOMA, UNSPECIFIED GLAUCOMA STAGE, UNSPECIFIED LATERALITY: ICD-10-CM

## 2025-07-14 DIAGNOSIS — Z13.228 SCREENING FOR METABOLIC DISORDER: ICD-10-CM

## 2025-07-14 DIAGNOSIS — M25.50 ARTHRALGIA OF MULTIPLE SITES: ICD-10-CM

## 2025-07-14 DIAGNOSIS — Z13.29 SCREENING FOR THYROID DISORDER: ICD-10-CM

## 2025-07-15 DIAGNOSIS — H40.1232 LOW-TENSION GLAUCOMA, BILATERAL, MODERATE STAGE: ICD-10-CM

## 2025-07-15 RX ORDER — NETARSUDIL AND LATANOPROST OPHTHALMIC SOLUTION, 0.02%/0.005% .2; .05 MG/ML; MG/ML
1 SOLUTION/ DROPS OPHTHALMIC; TOPICAL NIGHTLY
Qty: 2.5 ML | Refills: 11 | Status: SHIPPED | OUTPATIENT
Start: 2025-07-15

## 2025-07-25 ENCOUNTER — APPOINTMENT (OUTPATIENT)
Dept: OPHTHALMOLOGY | Facility: CLINIC | Age: 40
End: 2025-07-25
Payer: COMMERCIAL

## 2025-07-26 LAB
ALBUMIN SERPL-MCNC: 4.6 G/DL (ref 3.6–5.1)
ALP SERPL-CCNC: 46 U/L (ref 31–125)
ALT SERPL-CCNC: 13 U/L (ref 6–29)
ANION GAP SERPL CALCULATED.4IONS-SCNC: 5 MMOL/L (CALC) (ref 7–17)
AST SERPL-CCNC: 14 U/L (ref 10–30)
BASOPHILS # BLD AUTO: 58 CELLS/UL (ref 0–200)
BASOPHILS NFR BLD AUTO: 1.2 %
BILIRUB SERPL-MCNC: 0.8 MG/DL (ref 0.2–1.2)
BUN SERPL-MCNC: 10 MG/DL (ref 7–25)
CALCIUM SERPL-MCNC: 9.1 MG/DL (ref 8.6–10.2)
CHLORIDE SERPL-SCNC: 104 MMOL/L (ref 98–110)
CHOLEST SERPL-MCNC: 192 MG/DL
CHOLEST/HDLC SERPL: 3 (CALC)
CO2 SERPL-SCNC: 30 MMOL/L (ref 20–32)
CREAT SERPL-MCNC: 0.71 MG/DL (ref 0.5–0.97)
EGFRCR SERPLBLD CKD-EPI 2021: 111 ML/MIN/1.73M2
EOSINOPHIL # BLD AUTO: 158 CELLS/UL (ref 15–500)
EOSINOPHIL NFR BLD AUTO: 3.3 %
ERYTHROCYTE [DISTWIDTH] IN BLOOD BY AUTOMATED COUNT: 12.1 % (ref 11–15)
GLUCOSE SERPL-MCNC: 90 MG/DL (ref 65–99)
HCT VFR BLD AUTO: 42.7 % (ref 35–45)
HDLC SERPL-MCNC: 64 MG/DL
HGB BLD-MCNC: 13.8 G/DL (ref 11.7–15.5)
LDLC SERPL CALC-MCNC: 113 MG/DL (CALC)
LYMPHOCYTES # BLD AUTO: 2026 CELLS/UL (ref 850–3900)
LYMPHOCYTES NFR BLD AUTO: 42.2 %
MCH RBC QN AUTO: 30.6 PG (ref 27–33)
MCHC RBC AUTO-ENTMCNC: 32.3 G/DL (ref 32–36)
MCV RBC AUTO: 94.7 FL (ref 80–100)
MONOCYTES # BLD AUTO: 408 CELLS/UL (ref 200–950)
MONOCYTES NFR BLD AUTO: 8.5 %
NEUTROPHILS # BLD AUTO: 2150 CELLS/UL (ref 1500–7800)
NEUTROPHILS NFR BLD AUTO: 44.8 %
NONHDLC SERPL-MCNC: 128 MG/DL (CALC)
PLATELET # BLD AUTO: 268 THOUSAND/UL (ref 140–400)
PMV BLD REES-ECKER: 9.6 FL (ref 7.5–12.5)
POTASSIUM SERPL-SCNC: 4.3 MMOL/L (ref 3.5–5.3)
PROT SERPL-MCNC: 6.9 G/DL (ref 6.1–8.1)
RBC # BLD AUTO: 4.51 MILLION/UL (ref 3.8–5.1)
SODIUM SERPL-SCNC: 139 MMOL/L (ref 135–146)
TRIGL SERPL-MCNC: 68 MG/DL
TSH SERPL-ACNC: 2.05 MIU/L
WBC # BLD AUTO: 4.8 THOUSAND/UL (ref 3.8–10.8)

## 2025-07-28 ENCOUNTER — APPOINTMENT (OUTPATIENT)
Dept: PRIMARY CARE | Facility: CLINIC | Age: 40
End: 2025-07-28
Payer: COMMERCIAL

## 2025-07-28 VITALS
DIASTOLIC BLOOD PRESSURE: 82 MMHG | SYSTOLIC BLOOD PRESSURE: 128 MMHG | RESPIRATION RATE: 16 BRPM | OXYGEN SATURATION: 99 % | BODY MASS INDEX: 24.3 KG/M2 | HEIGHT: 66 IN | HEART RATE: 78 BPM | TEMPERATURE: 98.6 F | WEIGHT: 151.2 LBS

## 2025-07-28 DIAGNOSIS — Z00.00 HEALTH MAINTENANCE EXAMINATION: Primary | ICD-10-CM

## 2025-07-28 PROBLEM — J03.00 ACUTE STREPTOCOCCAL TONSILLITIS: Status: ACTIVE | Noted: 2025-07-28

## 2025-07-28 PROBLEM — N92.0 MENORRHAGIA WITH REGULAR CYCLE: Status: ACTIVE | Noted: 2025-07-28

## 2025-07-28 PROBLEM — H47.293 PARTIAL OPTIC ATROPHY OF BOTH EYES: Status: ACTIVE | Noted: 2025-07-28

## 2025-07-28 PROBLEM — J01.90 ACUTE SINUSITIS: Status: ACTIVE | Noted: 2025-07-28

## 2025-07-28 PROBLEM — K59.00 CONSTIPATION: Status: ACTIVE | Noted: 2025-07-28

## 2025-07-28 PROBLEM — J01.00 ACUTE NON-RECURRENT MAXILLARY SINUSITIS: Status: ACTIVE | Noted: 2025-07-28

## 2025-07-28 PROBLEM — J02.9 SORE THROAT: Status: ACTIVE | Noted: 2025-07-28

## 2025-07-28 PROBLEM — H52.13 HIGH MYOPIA, BOTH EYES: Status: ACTIVE | Noted: 2025-07-28

## 2025-07-28 PROBLEM — H40.1221 LOW-TENSION GLAUCOMA OF LEFT EYE, MILD STAGE: Status: ACTIVE | Noted: 2025-07-28

## 2025-07-28 PROBLEM — R42 LIGHTHEADED: Status: ACTIVE | Noted: 2025-07-28

## 2025-07-28 PROBLEM — S60.559A FOREIGN BODY IN HAND: Status: ACTIVE | Noted: 2025-07-28

## 2025-07-28 PROBLEM — T78.40XA ALLERGIES: Status: ACTIVE | Noted: 2025-07-28

## 2025-07-28 PROBLEM — H40.1211 LOW TENSION GLAUCOMA OF RIGHT EYE, MILD STAGE: Status: ACTIVE | Noted: 2025-07-28

## 2025-07-28 PROBLEM — R10.2 PELVIC PAIN: Status: ACTIVE | Noted: 2025-07-28

## 2025-07-28 PROCEDURE — 99395 PREV VISIT EST AGE 18-39: CPT | Performed by: FAMILY MEDICINE

## 2025-07-28 PROCEDURE — 3008F BODY MASS INDEX DOCD: CPT | Performed by: FAMILY MEDICINE

## 2025-07-28 PROCEDURE — 1036F TOBACCO NON-USER: CPT | Performed by: FAMILY MEDICINE

## 2025-07-28 ASSESSMENT — ENCOUNTER SYMPTOMS
OCCASIONAL FEELINGS OF UNSTEADINESS: 0
LOSS OF SENSATION IN FEET: 0
DEPRESSION: 0

## 2025-07-28 ASSESSMENT — PATIENT HEALTH QUESTIONNAIRE - PHQ9
2. FEELING DOWN, DEPRESSED OR HOPELESS: NOT AT ALL
SUM OF ALL RESPONSES TO PHQ9 QUESTIONS 1 AND 2: 0
1. LITTLE INTEREST OR PLEASURE IN DOING THINGS: NOT AT ALL

## 2025-07-28 NOTE — PROGRESS NOTES
"Subjective   Patient ID: Dina Martinez is a 40 y.o. female who presents for Annual Exam (EPV, WWE - No PAP/Blood Work Completed 7/25/25).    History of Present Illness  Dina Martinez is a 40 year old female who presents for an annual physical exam.    She has a history of pelvic floor issues and underwent a sacrocolpopexy with mesh placement for bladder support in June of last year. She has been referred to physical therapy for her pelvic floor.    She experiences ongoing hearing issues, particularly on the right side, despite using Flonase. The treatment has improved her left side but not the right, causing difficulty hearing her  and children.    Review of Systems  General: no fever or night sweats, no change in weight  Eyes: no vision disturbance  ENT: no mouth lesions, no sore throat, and no dysphagia  CV: no chest pain, no palpitations  Resp: no shortness of breath, no cough  GI: occasional abdominal pain, no change in bowel habits, some constipation   : no urinary problems  MSK: no arthralgias, myalgias, or back pain  Skin; no rashes or new/changed skin lesions  Neuro: no headaches     Objective     /82 (BP Location: Left arm, Patient Position: Sitting)   Pulse 78   Temp 37 °C (98.6 °F) (Oral)   Resp 16   Ht 1.675 m (5' 5.95\")   Wt 68.6 kg (151 lb 3.2 oz)   LMP 08/01/2020   SpO2 99%   BMI 24.45 kg/m²      Physical Exam  Appears well.     HEENT: OP clear. Sclera white. PERRL. EACs and TMs clear.     Neck: supple, no masses, or lymphadenopathy. No thyromegaly.     CVS: RRR. No murmurs.     Lungs: clear with normal inspirations and expirations.    Breasts: Symmetrical, no masses, no skin retraction or dimpling. No nipple discharge. No axillary nodes.    Abd: soft, NT, no masses or HSM.    Skin: No suspicious lesions.      Reviewed recent labs: all WNL   Assessment/Plan     Diagnoses and all orders for this visit:  Health maintenance examination    Assessment & Plan    Routine health " maintenance. Discussed mammogram screening starting at age 40. Recent blood work normal.      Maribel Bower MD    This medical note was created with the assistance of artificial intelligence (AI) for documentation purposes. The content has been reviewed and confirmed by the healthcare provider for accuracy and completeness. Patient consented to the use of audio recording and use of AI during their visit.

## 2025-08-15 ENCOUNTER — APPOINTMENT (OUTPATIENT)
Dept: OPHTHALMOLOGY | Facility: CLINIC | Age: 40
End: 2025-08-15
Payer: COMMERCIAL

## 2025-08-15 DIAGNOSIS — H40.1232 LOW-TENSION GLAUCOMA, BILATERAL, MODERATE STAGE: Primary | ICD-10-CM

## 2025-08-15 DIAGNOSIS — H40.1222 LOW-TENSION GLAUCOMA OF LEFT EYE, MODERATE STAGE: ICD-10-CM

## 2025-08-15 PROCEDURE — 99214 OFFICE O/P EST MOD 30 MIN: CPT | Performed by: OPHTHALMOLOGY

## 2025-08-15 PROCEDURE — 92083 EXTENDED VISUAL FIELD XM: CPT | Performed by: OPHTHALMOLOGY

## 2025-08-15 PROCEDURE — 92133 CPTRZD OPH DX IMG PST SGM ON: CPT | Performed by: OPHTHALMOLOGY

## 2025-08-15 RX ORDER — NETARSUDIL AND LATANOPROST OPHTHALMIC SOLUTION, 0.02%/0.005% .2; .05 MG/ML; MG/ML
1 SOLUTION/ DROPS OPHTHALMIC; TOPICAL NIGHTLY
Qty: 7.5 ML | Refills: 3 | Status: SHIPPED | OUTPATIENT
Start: 2025-08-15 | End: 2026-08-15

## 2025-08-15 ASSESSMENT — ENCOUNTER SYMPTOMS
EYES NEGATIVE: 1
CONSTITUTIONAL NEGATIVE: 0
GASTROINTESTINAL NEGATIVE: 0
ALLERGIC/IMMUNOLOGIC NEGATIVE: 0
MUSCULOSKELETAL NEGATIVE: 0
PSYCHIATRIC NEGATIVE: 0
RESPIRATORY NEGATIVE: 0
ENDOCRINE NEGATIVE: 0
CARDIOVASCULAR NEGATIVE: 0
NEUROLOGICAL NEGATIVE: 0
HEMATOLOGIC/LYMPHATIC NEGATIVE: 0

## 2025-08-15 ASSESSMENT — REFRACTION_WEARINGRX
OS_SPHERE: -7.00
OD_AXIS: 160
OD_CYLINDER: -1.50
OS_CYLINDER: -1.00
OS_AXIS: 015
OD_SPHERE: -7.00

## 2025-08-15 ASSESSMENT — VISUAL ACUITY
OS_CC: 20/20
OD_CC: 20/20
METHOD: SNELLEN - LINEAR

## 2025-08-15 ASSESSMENT — TONOMETRY
OD_IOP_MMHG: 10
IOP_METHOD: GOLDMANN APPLANATION
OS_IOP_MMHG: 11

## 2025-08-15 ASSESSMENT — CONF VISUAL FIELD: COMMENTS: HVF

## 2025-08-15 ASSESSMENT — PACHYMETRY
OD_CT(UM): 551
OS_CT(UM): 551

## 2025-08-15 ASSESSMENT — CUP TO DISC RATIO
OS_RATIO: 0.9
OD_RATIO: 0.9

## 2025-08-15 ASSESSMENT — EXTERNAL EXAM - RIGHT EYE: OD_EXAM: NORMAL

## 2025-08-15 ASSESSMENT — SLIT LAMP EXAM - LIDS
COMMENTS: NORMAL
COMMENTS: NORMAL

## 2025-08-15 ASSESSMENT — EXTERNAL EXAM - LEFT EYE: OS_EXAM: NORMAL

## 2026-01-16 ENCOUNTER — APPOINTMENT (OUTPATIENT)
Dept: OPHTHALMOLOGY | Facility: CLINIC | Age: 41
End: 2026-01-16
Payer: COMMERCIAL

## 2026-05-04 ENCOUNTER — APPOINTMENT (OUTPATIENT)
Facility: CLINIC | Age: 41
End: 2026-05-04
Payer: COMMERCIAL

## 2026-07-30 ENCOUNTER — APPOINTMENT (OUTPATIENT)
Dept: PRIMARY CARE | Facility: CLINIC | Age: 41
End: 2026-07-30
Payer: COMMERCIAL